# Patient Record
(demographics unavailable — no encounter records)

---

## 2018-11-02 NOTE — OPERATIVE REPORT E
Operative Report



NAME: JOSH CHANDRA

MRN:  [id number]          : 1983 AGE:  35Y

DATE OF SURGERY: 2018                        ROOM:



PREOPERATIVE DIAGNOSES:

1.  Menorrhagia.

2.  Uterine leiomyoma.



POSTOPERATIVE DIAGNOSES:

1.  Menorrhagia.

2.  Uterine leiomyoma.



PROCEDURE:

Hysteroscopic resection of submucous uterine leiomyoma.



SURGEON:

AUDREY PALOMARES M.D.



COMPLICATIONS:

None.



ANESTHESIA:

LMA.



FINDINGS:

An approximately 2-3 cm submucous fibroid protruding into the endometrial

cavity.  Post excision, the cavity appeared to be normal with no other

lesions noted.



The EUA demonstrated no adnexal masses palpable.  Exam was limited by

patient's size.



INDICATIONS FOR PROCEDURE:

The patient had abnormal uterine bleeding and desired for future pregnancy

and finding of submucous fibroid and some serous leiomyomas noted on

ultrasound.  She desired attempt at future pregnancy.  The usual risks of

bleeding, infection, anesthesia, and damage to organs and tissues

discussed and the patient understood.



DESCRIPTION OF PROCEDURE:

The patient was taken to the operating room and placed in the modified

lithotomy position after adequate anesthesia ascertained, EUA performed

after surgical timeout was performed and antibiotics had been given. 

Cervix dilated to admit an operative hysteroscope.  The anterior lip of

the cervix grasped with a tenaculum.  Limits of the lesion were

identified.  MyoSure device was deployed and *------* amount of time was

used for resection of the fibroid, which was that of pedunculated base. 

The specimen was removed en toto.  *------* was roughly equivalent with

3000 mL of normal saline used.  This was recovered in the suction and

drapes.



A post-procedure stitch was placed on the anterior lip of the cervix with

the tenaculum torn once and this was then made hemostatic to a chromic

catgut.  Bleeding was nil at the completion of procedure.  The patient was

taken to the recovery room in stable condition.



DICTATING PHYSICIAN:  AUDREY PALOMARES M.D.





[id number]                  DT: 2018    [id number]

PHY#: [id number]            DD: [id number]

ID:   [id number]           JOB#: [id number]       ACCT: [id number]



cc:AUDREY PALOMARES M.D.

>